# Patient Record
(demographics unavailable — no encounter records)

---

## 2024-10-08 NOTE — ASSESSMENT
[FreeTextEntry1] : Subjective findings This 79-year-old male presents to us with pain in the back with a rating component down the left leg.  Patient says that this pain started after a fall off of a scaffold from 8 feet height.  Patient's been under the care of a neurosurgeon and is contemplating surgery but suggested the patient try epidural steroid injections prior to the surgery.  Patient denies any bowel or bladder incontinence any progressive weakness but states the pain has become problematic patient presents to us for evaluation.  The CV/Pulm/GI/Heme/Renal/Hepatic//Psych/Endo systems are reviewed. A full ROS was performed and reviewed with the patient today, see intake form.  Average pain score for the month is 6 out of ten. The patient's current medications are documented to the best of their ability. The patient has failed conservative therapies to include medical management, and physical activity to treat the pain. The patient has decreased function secondary to the pain. Objective findings Imaging studies are consistent with the patient's pain complaints.  Patient is able to get to a standing position with minimal difficulty.  While standing patient ambulates without an assistive device.  Motor and sensory exams appear grossly intact. Assessment Lumbar radiculopathy Plan Spoke to patient about epidural steroid injections. Explained risks, benefits and alternatives including but not limited to the risk of infection, bleeding, headache, syncopal episode, failure to resolve issues, allergic reaction, symptom recurrence, allergic reaction, nerve injury, and increased pain. The patient understands the risks. All questions were answered. The patient is willing to proceed. The importance of increasing exercise after the injection is also stressed. The use of the injection as a jump start so that the patient can do more exercise, but that the exercise is the long-term answer for the patient is reviewed. The patient states understanding.  This note was generated by using Dragon medical dictation software.  A reasonable effort has been made for proofreading its contents, but typos may still remain.  If there are any questions or points of clarification needed, please notify my office.  ]

## 2024-10-08 NOTE — HISTORY OF PRESENT ILLNESS
[FreeTextEntry1] : THIS PLEASANT PATIENT IS 79 year OLD  male  WHO PRESENT TODAY IN OFFICE WITH LSPINE RADATING TO THE LT HIP DWN TO THE FOOT      DATE OF INJURY/ONSET  3 WKS AGO PROGRESSIGN      PAIN LEVEL: 7/10     MECHANISM OF INJURY: UNKOWN INJURAY         QUALITY OF SYMPTOMS:  BURNIONG, SHOOTING, STABBIGN      IMPROVES WITH:  NTHING     WORSE WITH:  SITITNG, STANDING, WALKING      PRIOR TREATMENT:  2024 PATIENT IS PRESENTING WITH ACUTE/SUB-ACUTE RADICULAR PAIN WITH IMPAIRMENT IN ADLs AND FUNCTIONALITY. THE PATIENT HAS NOT RESPONDED TO CONSERVATIVE CARE INCLUDING NSAID THERAPY AND/OR PHYSICAL THERAPY 2-3X A WEEK FOR 6 WEEK.           PRIOR IMAGIN       SCHOOL/SPORT/POSITION/OCCUPATION:       ADDITIONAL INFORMATION

## 2024-11-13 NOTE — PHYSICAL EXAM
[Alert] : alert [No Acute Distress] : in no acute distress [Sclera] : the sclera and conjunctiva were normal [EOMI] : extraocular movements were intact [Normal Outer Ear/Nose] : the ears and nose were normal in appearance [Normal Appearance] : the appearance of the neck was normal [No Respiratory Distress] : no respiratory distress [No Acc Muscle Use] : no accessory muscle use [Respiration, Rhythm And Depth] : normal respiratory rhythm and effort [Auscultation Breath Sounds / Voice Sounds] : lungs were clear to auscultation bilaterally [Normal S1, S2] : normal S1 and S2 [Heart Rate And Rhythm] : heart rate was normal and rhythm regular [Edema] : edema was not present [Abdomen Tenderness] : non-tender [Abdomen Soft] : soft [Involuntary Movements] : no involuntary movements were seen [Normal Color / Pigmentation] : normal skin color and pigmentation [No Focal Deficits] : no focal deficits [Normal Affect] : the affect was normal [Normal Mood] : the mood was normal [de-identified] : obese

## 2024-11-13 NOTE — HISTORY OF PRESENT ILLNESS
[FreeTextEntry1] : 79yoM with CKD, HTN, lumbar and cervical stenosis, predm2, seen for follow up visit   htn: states he is adherent with amlodine 5mg  denies acute pain today has gained weight  dysphagia:  reports episodes of dysphagia, more with liquids than food during both lunch and dinner  sob: has been ongoing denies new coughiing or cold was referred to pulmonary, but he hasn't yet mad appointment he is a  and has hx of 9/11 exposure hasn't had PFT's yet had normal nuclear stress test 1/2024 also with more sedentary lately and weight gain  predm2: 9/2024 labs stable 5.7%  CKD: 9/2024 labs with stable cr and electrolytes cr ~1.5 hx of hyperkalemia with hctz and acei  sleep disturbance: states trazadone 50mg was only partially effective denies side effects  pain: has been taking gabapentin once to twice daily which is helping hx of steroid injection with ortho   hx of vertebral fracture sept 2023 after falling 9 feet of C6, T2, and sternal fracture: denies any pain within his cervical spine left leg dvt provoked 9/2023: completed tx and had negative f/u doppler

## 2024-11-13 NOTE — ASSESSMENT
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

## 2024-11-13 NOTE — REVIEW OF SYSTEMS
[Fever] : no fever [Chills] : no chills [Eyesight Problems] : no eyesight problems [As Noted in HPI] : as noted in HPI [Sleep Disturbances] : sleep disturbances [Negative] : Integumentary

## 2024-11-25 NOTE — REVIEW OF SYSTEMS
[Shortness Of Breath] : shortness of breath [SOB on Exertion] : shortness of breath during exertion [Fever] : no fever [Chills] : no chills [Chest Pain] : no chest pain [Palpitations] : no palpitations [Lower Ext Edema] : no extremity edema [Cough] : no cough

## 2024-11-25 NOTE — ASSESSMENT
[FreeTextEntry1] : dyspnea: suspect COPD pending pft- given the worsening symptoms and now delay in pfts until next month will start trial of spiriva and albuterol. 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  sleep disturbance: cw trazadone to 100mg qhs

## 2024-11-25 NOTE — PHYSICAL EXAM
[Alert] : alert [No Acute Distress] : in no acute distress [Sclera] : the sclera and conjunctiva were normal [EOMI] : extraocular movements were intact [Normal Appearance] : the appearance of the neck was normal [No Respiratory Distress] : no respiratory distress [No Acc Muscle Use] : no accessory muscle use [Respiration, Rhythm And Depth] : normal respiratory rhythm and effort [Normal S1, S2] : normal S1 and S2 [Murmurs] : no murmurs [Heart Rate And Rhythm] : heart rate was normal and rhythm regular [Edema] : edema was not present [Abdomen Soft] : soft [Normal Gait] : normal gait [Involuntary Movements] : no involuntary movements were seen [Normal Color / Pigmentation] : normal skin color and pigmentation [No Focal Deficits] : no focal deficits [Normal Affect] : the affect was normal [Normal Mood] : the mood was normal [de-identified] : decreased breath sounds in right base [de-identified] : obese

## 2024-11-25 NOTE — HISTORY OF PRESENT ILLNESS
[FreeTextEntry1] : 79yoM with CKD, HTN, lumbar and cervical stenosis, predm2, seen for follow up visit  htn: states he is adherent with amlodine 10mg  dysphagia: reports episodes of dysphagia, more with liquids than food during both lunch and dinner pending swallow study  sob: has been ongoing, both at rest, and worse with exertion.  states its getting worse. denies new coughiing or cold was referred to pulmonary,but he missed his appointment today- he is a  and has hx of 9/11 exposure hasn't had PFT's yet had normal nuclear stress test 1/2024, echo with normal LVEF, minimal valvular disease also with more sedentary lately and weight gain Xray 12/27/2023 unrevealing  predm2: 9/2024 labs stable 5.7%  CKD: 9/2024 labs with stable cr and electrolytes cr ~1.5 hx of hyperkalemia with hctz and acei  sleep disturbance: remains on trazadone 100mg denies side effects  pain: has been taking gabapentin once to twice daily which is helping hx of steroid injection with ortho  hx of vertebral fracture sept 2023 after falling 9 feet of C6, T2, and sternal fracture: denies any pain within his cervical spine left leg dvt provoked 9/2023: completed tx and had negative f/u doppler  he denies any bleeding

## 2024-12-04 NOTE — ASSESSMENT
[FreeTextEntry1] : Interim history The patient returns with pain that has been treated adequately in the past with epidural steroid injections.  The patient's complaints are consistent with radiculopathy. Patient's ADL's increase with prior ANGELICA.   The last injection gave greater than 60% reduction of the pain but now there is a return of symptoms. The patient is requesting a subsequent epidural steroid injection to alleviate pain and improve functional ability and quality of life.  Patient is a candidate. Objective findings Since the last visit, there have been no new imaging studies. The patient has no new symptoms except the return of the their pain complaints. Motor and sensory function is unchanged. Plan Spoke to patient about epidural steroid injections.  This will be a distinctly different level than the last injection.  Explained risks, benefits and alternatives including but not limited to the risk of infection, bleeding, headache, syncopal episode, failure to resolve issues, allergic reaction, symptom recurrence, allergic reaction, nerve injury, and increased pain. The patient understands the risks. All questions were answered. The patient is willing to proceed.  This note was generated by using Dragon medical dictation software.  A reasonable effort has been made for proofreading its contents, but typos may still remain.  If there are any questions or points of clarification needed, please notify my office.

## 2024-12-04 NOTE — HISTORY OF PRESENT ILLNESS
[FreeTextEntry1] : Mr. MARLENE FRANCIS IS FOLLOWING UP FOR  RETURNING PAIN OF LSPINE RADATING DWN BTH HIP DWN LEG  . PT STATES LAST ANGELICA 2024 DECREASED PAIN AND INCREASED ADL.   DURATION OF RELIEF: PATIENT HAD RELIEF FOR 1MNTH    PAIN LEVEL BEFORE ANGELICA:7/10  PAIN LEVEL AFTER ANGELICA:2/10  CURRENT PAIN LEVEL:7/10  DATE OF INJURY/ONSET  3 WKS AGO PROGRESSIGN         MECHANISM OF INJURY: UNKOWN INJURAY         QUALITY OF SYMPTOMS:  BURNIONG, SHOOTING, STABBIGN      IMPROVES WITH:  NTHING     WORSE WITH:  SITITNG, STANDING, WALKING      PRIOR TREATMENT:  2024 PATIENT IS PRESENTING WITH ACUTE/SUB-ACUTE RADICULAR PAIN WITH IMPAIRMENT IN ADLs AND FUNCTIONALITY. THE PATIENT HAS NOT RESPONDED TO CONSERVATIVE CARE INCLUDING NSAID THERAPY AND/OR PHYSICAL THERAPY 2-3X A WEEK FOR 6 WEEK.           PRIOR IMAGIN       SCHOOL/SPORT/POSITION/OCCUPATION:       ADDITIONAL INFORMATION

## 2024-12-17 NOTE — PHYSICAL EXAM
[Normal] : normal venous pressure, no carotid bruit [___ +] : bilateral [unfilled]U+ pitting edema to the ankles

## 2024-12-17 NOTE — CARDIOLOGY SUMMARY
[de-identified] : December 17, 2024 sinus rhythm unchanged from prior consistent with possible pulmonary disease

## 2024-12-17 NOTE — ASSESSMENT
[FreeTextEntry1] : Impression: 1.  Chronic dyspnea on exertion which has been going on for many years most probably pulmonary in nature as his  heart exams noninvasively have been normal. 2.  Ankle edema most probably secondary to high-dose amlodipine use.  Plan: 1.  Unfortunately options at this moment are limited with regards to hypertension due to his renal insufficiency that has been demonstrated on blood work the last 1 done in May this also may preclude coronary CTA to rule out significant disease  For now we will discontinue amlodipine  Start hydralazine 25 mg twice per day  Obtain repeat renal profile  Will reassess after this is done  Have encouraged patient to follow-up with his scheduled pulmonary appointment

## 2024-12-17 NOTE — REASON FOR VISIT
[Other: ____] : [unfilled] [FreeTextEntry1] : Patient returns for reevaluation because he continues to have shortness of breath.  He has no chest pain.  He does have pedal edema however.  The patient states he is here at the advice of his daughters.  He has had a negative noninvasive workup within the last year.  He iwas a9/11 .  [employer] worker

## 2024-12-30 NOTE — HISTORY OF PRESENT ILLNESS
[Dyspnea] : dyspnea [Lower Extremity Swelling] : lower extremity swelling [Poor Exercise Tolerance] : poor exercise tolerance [Prior Anesthesia] : Prior anesthesia [Poor] : Poor [Preoperative Visit] : for a medical evaluation prior to surgery [Scheduled Procedure ___] : a [unfilled] [Date of Surgery ___] : on [unfilled] [Fever] : no fever [Chills] : no chills [Fatigue] : no fatigue [Cough] : no cough [Dysuria] : no dysuria [Urinary Frequency] : no urinary frequency [Nausea] : no nausea [Vomiting] : no vomiting [Diarrhea] : no diarrhea [Abdominal Pain] : no abdominal pain [Easy Bruising] : no easy bruising [Anti-Platelet Agents] : no anti-platelet agents [Nicotine Dependence] : no nicotine dependence [Alcohol Use] : no  alcohol use [Thromboembolic Problems] : no thromboembolic problems [Frequent use of NSAIDs] : no use of NSAIDs [Prev Anesthesia Reaction] : no previous anesthesia reaction [Anesthesia Reaction] : no anesthesia reaction [Clotting Disorder] : no clotting disorder [Bleeding Disorder] : no bleeding disorder [de-identified] : Dr. Tomy Latif [FreeTextEntry1] : 79yoM with CKD, HTN, lumbar and cervical stenosis, predm2, seen for preoperative clearance.  htn: no longer on norvasc due to pedal edema now on hydralazine 25mg bid and valsartan-hctz 160-12.5mg daily  BP controlled reports still with some pedal edema  dysphagia: reports episodes of dysphagia, more with liquids than food during both lunch and dinner pending swallow study  sob: has been ongoing, both at rest, and worse with exertion.  denies new coughing or cold was referred to pulmonary, pending appointment Jan 7th. he is a  and has hx of 9/11 exposure hasn't had PFT's yet had normal nuclear stress test 1/2024, echo with normal LVEF, minimal valvular disease recently seen by cardiologist and is scheduled for CT angio coronary on Jan 3rd. also with more sedentary lately and weight gain Xray 12/27/2023 unrevealing  predm2: 9/2024 labs stable 5.7%  CKD: GFR ~ 53 12/2024:  Cr. 1.35 with normal electrolytes 9/2024 labs with stable cr and electrolytes   sleep disturbance: remains on trazadone 100mg, but still with incomplete relief denies side effects  pain: has been taking gabapentin once to twice daily which is helping hx of steroid injection with ortho   hx of vertebral fracture sept 2023 after falling 9 feet of C6, T2, and sternal fracture: denies any pain within his cervical spine left leg dvt provoked 9/2023: completed tx and had negative f/u doppler  he denies any bleeding

## 2024-12-30 NOTE — ASSESSMENT
[FreeTextEntry1] : left hip pain with radiculopathy: with lumbar disease: pain control with tylenol 2 tabs bid gabapentin 300mg  bid   shortness of breath with exertion: CT aqngio Friday puolmonary Dr. Munson jan 7th swallow study jan 234d  Pre-op [facility] 12/31 -  Dr. Tomy Latif-ortho spine- surgeon open MRI few weeks ago  Blurry vision: seeing ophthal next week

## 2025-01-10 NOTE — ASSESSMENT
[FreeTextEntry1] : 79M CKD, HTN, and snoring presenting for initial pulmonary evaluation in the setting of dyspnea.  #Preop Pulmonary Evaluation - Patient is awaiting potential spinal surgery but is undergoing further workup with PMD and Cardiology. - There are no absolute pulmonary contraindications, at this time, to proceeding with a spinal surgery. The patient is presently LOW risk based on the ARISCAT risk calculator for perioperative pulmonary complications. - Patient does not require any further pulmonary testing or treatments at this time. He has not had any recent hospitalizations for pulmonary infections or any evidence of hypoxemia.  #Interstitial Lung Disease - Patient is unsure whether he would agree to treatment or not and wants to consider his options but he is amenable to blood work - Serologic workup today to evaluate for potential causes - PFTs noted with restrictive ventilatory defect (mild) and normal diffusion capacity - Repeat CT chest in 6 months - CPET - Patient with extensive exposures as a  and with time at 9/11. I provided him with the contact information for the [facility] to see if he is a candidate  #Dyspnea - likely multifactorial and has developed after his injury 1 year ago after which time his activity level has decreased significantly - Weight loss will be beneficial - Increase activity level - Cardiology evaluation underway - echo from 2024 did not reveal evidence of pulmonary hypertension - PFTs without significant bronchodilator response but patient felt a little better after Albuterol therefore will give a trial of Albuterol and monitor for improvement  #Snoring and Obesity - Patient recommended for HST but states he does not want to proceed with this at this time - Weight loss will be beneficial - Patient is aware of the recommendation and will consider  #Health Maintenance Spirometry: Reviewed  Smoking status: Never smoker  Pneumococcal vaccination status:  Patient unsure - should be done if not done previously Newell Sleepiness Scale: Not a sleep visit  #Follow-up in 2-4 months or sooner if needed - All questions answered - Extensive discussion with patient. I also offered to call his daughters or granddaughter today to discuss our findings and recommendations  but he declined -  Today's medical care services serve as the continuing focal point for needed health care services that are part of ongoing care related to a patient's one or more serious conditions or a complex condition.   The above plan was discussed with MARLENE FRANCIS  in detail. Patient verbalized understanding and agrees with plan as detailed above. Patient was provided education and counselling on current diagnosis/symptoms, diagnostic work up, treatment options and potential side effects of any prescribed therapy/therapies. MARLENE  was advised to call our clinic at [phone] for any new or worsening symptoms, or with any questions or concerns. In case of acute onset of respiratory symptoms or worsening presentation, patient was advised to present to nearest emergency room for further evaluation. MARLENE  expressed understanding and all questions/concerns were addressed.

## 2025-01-10 NOTE — REVIEW OF SYSTEMS
[Fatigue] : fatigue [Recent Wt Gain (___ Lbs)] : ~T recent [unfilled] lb weight gain [EDS] : eds [Dyspnea] : dyspnea [SOB on Exertion] : sob on exertion [Nocturia] : nocturia [Arthralgias] : arthralgias [Obesity] : obesity [Fever] : no fever [Chills] : no chills [Sore Throat] : no sore throat [Nasal Congestion] : no nasal congestion [Postnasal Drip] : no postnasal drip [Cough] : no cough [Hemoptysis] : no hemoptysis [Chest Tightness] : no chest tightness [Frequent URIs] : no frequent URIs [Sputum] : no sputum [Wheezing] : no wheezing [Chest Discomfort] : no chest discomfort [Claudication] : no claudication [Leg Cramps] : no leg cramps [Orthopnea] : no orthopnea [Angioedema] : no angioedema [Abdominal Pain] : no abdominal pain [Nausea] : no nausea [Vomiting] : no vomiting [Myalgias] : no myalgias [Headache] : no headache [Focal Weakness] : no focal weakness [Seizures] : no seizures [Head Injury] : no head injury [Depression] : no depression [Anxiety] : no anxiety [Diabetes] : no diabetes [TextBox_14] : voice changes when short of breath [TextBox_91] : back and LE pain
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
## 2025-01-10 NOTE — PHYSICAL EXAM
[No Acute Distress] : no acute distress [Well Nourished] : well nourished [No Deformities] : no deformities [Well Developed] : well developed [Normal Oropharynx] : normal oropharynx [Normal Appearance] : normal appearance [Supple] : supple [No Neck Mass] : no neck mass [Normal Rate/Rhythm] : normal rate/rhythm [Normal S1, S2] : normal s1, s2 [No Resp Distress] : no resp distress [No Acc Muscle Use] : no acc muscle use [Normal Rhythm and Effort] : normal rhythm and effort [Clear to Auscultation Bilaterally] : clear to auscultation bilaterally [Not Tender] : not tender [Soft] : soft [Normal Gait] : normal gait [No Clubbing] : no clubbing [No Cyanosis] : no cyanosis [No Edema] : no edema [Normal Color/ Pigmentation] : normal color/ pigmentation [No Focal Deficits] : no focal deficits [No Motor Deficits] : no motor deficits [Oriented x3] : oriented x3 [Normal Affect] : normal affect [TextBox_11] : NCAT, EOMI, anicteric, MMM, no thrush [TextBox_68] : no wheeze or rhonch [TextBox_89] : obese

## 2025-01-10 NOTE — END OF VISIT
[Time Spent: ___ minutes] : I have spent [unfilled] minutes of time on the encounter which excludes teaching and separately reported services. [FreeTextEntry3] : My cumulative time spent on today's visit included: Preparation for the visit, review of the medical records, review of pertinent diagnostic studies, review and integration of laboratory data, coordination of care, examination and counseling of the patient on the above diagnosis, treatment plan and prognosis, orders of diagnostic tests and any additional lab data ordered today, medications and/or appropriate procedures and documentation in the medical records of today's visit.

## 2025-01-17 NOTE — PHYSICAL EXAM
[Alert] : alert [No Acute Distress] : in no acute distress [Sclera] : the sclera and conjunctiva were normal [EOMI] : extraocular movements were intact [No Respiratory Distress] : no respiratory distress [No Acc Muscle Use] : no accessory muscle use [Respiration, Rhythm And Depth] : normal respiratory rhythm and effort [Auscultation Breath Sounds / Voice Sounds] : lungs were clear to auscultation bilaterally [Normal S1, S2] : normal S1 and S2 [Heart Rate And Rhythm] : heart rate was normal and rhythm regular [No Clubbing, Cyanosis] : no clubbing or cyanosis of the fingernails [Involuntary Movements] : no involuntary movements were seen [Normal Color / Pigmentation] : normal skin color and pigmentation [de-identified] : mild pedal edema

## 2025-01-17 NOTE — ASSESSMENT
[FreeTextEntry1] : htn: improved c/w current regimen of hydralazine 50mg TID and nifedipine 30mg BID c/w daily bp checks x 1 week  ckd: avoid tea, increase hydration with water (may add 1/2 applejuice) overall trending better  repeat bmp next week tuesday   if Cr back to baseline, he can have the CT angio heart  will call next week with labwork results

## 2025-01-17 NOTE — HISTORY OF PRESENT ILLNESS
[FreeTextEntry1] : 79yoM seen for follow up for htn and ckd   saw nephrology last week started on nifedipine 30mg bid and increased hydralazine to 50mg TID bp's at [facility] have been "140/80" today same  CKD: [facility] end of dec with cr up to 2.00 patient states he drinks "no water" during day only tea, snapple. repeat labs 1/15 with improving in cr to 1.77  other medications verified he continues with pain in his left leg radiculopathy- wanting to have back surgery pending CT

## 2025-02-03 NOTE — ASSESSMENT
[FreeTextEntry1] : sob associated with chest tightness: concern for ACS vs acute heart failure ekg : sinus at rate of 73, with nonspecific t wave flattening in III, poor quality v1, v2 with ST changes  concern for possible ACS given clinical presentation recommend patient seek immediate evaluation in ER patient's jajanancy Hood called during visit and notified of plan EMS arrived and will take patient to ER  HTN: uncontrolled will also need adjustment in BP medications   CKD: following with Dr. Renaldo Pettit - Nephrologist  [phone] [facility] end of dec with cr up to 2.00 repeat labs 1/15 with improving in cr to 1.77 1/27/25:  Cr 1.55  eGFR 45

## 2025-02-03 NOTE — REVIEW OF SYSTEMS
[Fever] : no fever [As noted in HPI] : as noted in HPI [As Noted in HPI] : as noted in HPI [Negative] : Gastrointestinal

## 2025-02-03 NOTE — HISTORY OF PRESENT ILLNESS
[FreeTextEntry1] : 79yoM with CKD, HLD, seen for acute visit.  states yesterday walked around 1-2 hours at event, when got home with edema and sob. last nigh awoke at 3 am with severe SOB and chest tightness located in middle of chest-  had to sit in chair, and lasted approx 15- 20 minutes, then slowly felt better. today still with sob and milder chest tightness.  htn: bp elevated today states he took all of his medications

## 2025-02-03 NOTE — PHYSICAL EXAM
[Alert] : alert [Sclera] : the sclera and conjunctiva were normal [EOMI] : extraocular movements were intact [Normal Outer Ear/Nose] : the ears and nose were normal in appearance [Normal Appearance] : the appearance of the neck was normal [No Acc Muscle Use] : no accessory muscle use [Normal S1, S2] : normal S1 and S2 [Heart Rate And Rhythm] : heart rate was normal and rhythm regular [Abdomen Tenderness] : non-tender [Abdomen Soft] : soft [Involuntary Movements] : no involuntary movements were seen [Normal Color / Pigmentation] : normal skin color and pigmentation [No Focal Deficits] : no focal deficits [Normal Affect] : the affect was normal [Normal Mood] : the mood was normal [de-identified] : decreased breath sounds in right base [de-identified] : mild edema b/l lower legs [de-identified] : obese

## 2025-02-12 NOTE — ASSESSMENT
[FreeTextEntry1] : The patient was referred to the echocardiography laboratory.  Echocardiogram revealed normal left ventricular systolic function and no evidence of significant valvular stenosis or insufficiency.  In summary, the patient is a 79-year-old man with dyspnea his dyspnea is both exertional and nonexertional and has been quite chronic.  Based on his workup including his CTA it is very unlikely that his dyspnea is based on a cardiac etiology.  I have explained to him that he does indeed have mild nonobstructive coronary disease and that this could get worse and cause symptoms but I do not believe his current symptoms are cardiac in nature.  Given his atherosclerosis I have started him on atorvastatin 10 mg/day.  I have advised him to keep his pulmonary appointments  I have advised him to check his lipid profile in 2 months and return here for follow-up in 3 months time.  I have discussed the more classic symptoms of coronary disease including dyspnea only on exertion and midsternal chest discomfort with the patient.  I have encouraged him to call me with any issues that he believes might be of significance

## 2025-02-12 NOTE — REASON FOR VISIT
[Symptom and Test Evaluation] : symptom and test evaluation [FreeTextEntry1] : Patient returns for follow-up.  He continues to complain of shortness of breath.  His shortness of breath can be at rest or with exertion.  Of note is the fact that he was taken to the emergency room in [place] several weeks ago where myocardial infarction was ruled out.  Of note is the fact that on January 10 he had an evaluation by pulmonologist and is scheduled to return at the end of the month.  The patient claims that he had not seen a pulmonologist.  Of all of note is the fact as well that he underwent CTA recently which revealed nonobstructive disease.  His worst lesion was a 50 to 60% mid LAD lesion.  His left main was normal and circumflex and right coronary only had very minimal disease.
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
## 2025-02-27 NOTE — REVIEW OF SYSTEMS
[Fatigue] : fatigue [Recent Wt Gain (___ Lbs)] : ~T recent [unfilled] lb weight gain [EDS] : eds [Dyspnea] : dyspnea [SOB on Exertion] : sob on exertion [Nocturia] : nocturia [Arthralgias] : arthralgias [Obesity] : obesity [TextBox_14] : voice changes when short of breath [Fever] : no fever [Chills] : no chills [Sore Throat] : no sore throat [Nasal Congestion] : no nasal congestion [Postnasal Drip] : no postnasal drip [Cough] : no cough [Hemoptysis] : no hemoptysis [Chest Tightness] : no chest tightness [Frequent URIs] : no frequent URIs [Sputum] : no sputum [Wheezing] : no wheezing [Chest Discomfort] : no chest discomfort [Claudication] : no claudication [Leg Cramps] : no leg cramps [Orthopnea] : no orthopnea [Angioedema] : no angioedema [Abdominal Pain] : no abdominal pain [Nausea] : no nausea [Vomiting] : no vomiting [Myalgias] : no myalgias [Headache] : no headache [Focal Weakness] : no focal weakness [Seizures] : no seizures [Head Injury] : no head injury [Depression] : no depression [Anxiety] : no anxiety [Diabetes] : no diabetes [TextBox_91] : back and LE pain
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
## 2025-02-27 NOTE — END OF VISIT
[FreeTextEntry3] : My cumulative time spent on today's visit included: Preparation for the visit, review of the medical records, review of pertinent diagnostic studies, review and integration of laboratory data, coordination of care, examination and counseling of the patient on the above diagnosis, treatment plan and prognosis, orders of diagnostic tests and any additional lab data ordered today, medications and/or appropriate procedures and documentation in the medical records of today's visit.  [Time Spent: ___ minutes] : I have spent [unfilled] minutes of time on the encounter which excludes teaching and separately reported services.
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
## 2025-03-03 NOTE — ASSESSMENT
[Procedure Low Risk] : the procedure risk is low [Patient Low Risk] : the patient is a low surgical risk [Optimized for Surgery] : the patient is optimized for surgery [FreeTextEntry1] : patient is medically optimized to proceed with planned left cataract surgery. EKG from 2/12/2025 reviewed: sinus rhythm at rate of 77 with no acute abnormalities  patient is pending sleep study for GUIDO evaluation -- htn: BP slightly elevated today - he is due for his mid day hydralazine discussed medication adherence c/w nifedipine   anxiety: start zoloft 25mg daily  neuropathy: trial without gabapentin 300mg in am, monitor symtpoms, if worsens restart c/w gabapentin 300mg qhs

## 2025-03-03 NOTE — PHYSICAL EXAM
[Sclera] : the sclera and conjunctiva were normal [EOMI] : extraocular movements were intact [Normal Outer Ear/Nose] : the ears and nose were normal in appearance [Normal] : the appearance was normal, neck was supple [No Respiratory Distress] : no respiratory distress [No Acc Muscle Use] : no accessory muscle use [Respiration, Rhythm And Depth] : normal respiratory rhythm and effort [Auscultation Breath Sounds / Voice Sounds] : lungs were clear to auscultation bilaterally [Normal S1, S2] : normal S1 and S2 [Heart Rate And Rhythm] : heart rate was normal and rhythm regular [Abdomen Tenderness] : non-tender [Abdomen Soft] : soft [Involuntary Movements] : no involuntary movements were seen [Normal Color / Pigmentation] : normal skin color and pigmentation [No Focal Deficits] : no focal deficits [Normal Affect] : the affect was normal [Normal Mood] : the mood was normal [de-identified] : trace edema b/l

## 2025-03-03 NOTE — HISTORY OF PRESENT ILLNESS
[Cardiovascular Disease] : cardiovascular disease [Pulmonary Disease] : pulmonary disease [Renal Disease] : renal disease [Prior Anesthesia] : Prior anesthesia [Stable] : Stable [Dyspnea] : dyspnea [Preoperative Visit] : for a medical evaluation prior to surgery [Scheduled Procedure ___] : a [unfilled] [Date of Surgery ___] : on [unfilled] [Fever] : no fever [Chills] : no chills [Fatigue] : no fatigue [Chest Pain] : no chest pain [Cough] : no cough [Dysuria] : no dysuria [Urinary Frequency] : no urinary frequency [Nausea] : no nausea [Vomiting] : no vomiting [Diarrhea] : no diarrhea [Abdominal Pain] : no abdominal pain [Easy Bruising] : no easy bruising [Thromboembolic Problems] : no thromboembolic problems [Frequent use of NSAIDs] : no use of NSAIDs [Prev Anesthesia Reaction] : no previous anesthesia reaction [Echocardiogram] : ~T an echocardiogram ~C was performed [Fair] : Fair [Anesthesia Reaction] : no anesthesia reaction [Sudden Death] : no sudden death [Clotting Disorder] : no clotting disorder [Bleeding Disorder] : no bleeding disorder [de-identified] : Dr. Lona Ramon [de-identified] : CT angio coronary 2/10/2025: mild nonobstructive CAD [FreeTextEntry1] : 79yoM with CKD, HTN, lumbar and cervical stenosis, predm2, ILD, seen for preoperative clearance.  CT angio coronary 2/10/2025: mild nonobstructive CAD on lipitor 10mg daily  htn: remains on hydralazine and nifedipine  hx of vertebral fracture sept 2023 after falling 9 feet of C6, T2, and sternal fracture: denies any pain within his cervical spine left leg dvt provoked 9/2023: completed tx and had negative f/u doppler  predm2: 9/2024 labs stable 5.7%  CKD: GFR ~ 53 12/2024: Cr. 1.35 with normal electrolytes 9/2024 labs with stable cr and electrolytes   sleep disturbance: remains on trazadone 100mg, worse anxiety DALE 7 score of 18 c/w moderate anxiety GDS = 1 negative for depression  pain: has been taking gabapentin once to twice daily which is helping hx of steroid injection with ortho

## 2025-03-03 NOTE — HISTORY OF PRESENT ILLNESS
[Cardiovascular Disease] : cardiovascular disease [Pulmonary Disease] : pulmonary disease [Renal Disease] : renal disease [Prior Anesthesia] : Prior anesthesia [Stable] : Stable [Dyspnea] : dyspnea [Preoperative Visit] : for a medical evaluation prior to surgery [Scheduled Procedure ___] : a [unfilled] [Date of Surgery ___] : on [unfilled] [Fever] : no fever [Chills] : no chills [Fatigue] : no fatigue [Chest Pain] : no chest pain [Cough] : no cough [Dysuria] : no dysuria [Urinary Frequency] : no urinary frequency [Nausea] : no nausea [Vomiting] : no vomiting [Diarrhea] : no diarrhea [Abdominal Pain] : no abdominal pain [Easy Bruising] : no easy bruising [Thromboembolic Problems] : no thromboembolic problems [Frequent use of NSAIDs] : no use of NSAIDs [Prev Anesthesia Reaction] : no previous anesthesia reaction [Echocardiogram] : ~T an echocardiogram ~C was performed [Fair] : Fair [Anesthesia Reaction] : no anesthesia reaction [Sudden Death] : no sudden death [Clotting Disorder] : no clotting disorder [Bleeding Disorder] : no bleeding disorder [de-identified] : Dr. Lona Ramon [de-identified] : CT angio coronary 2/10/2025: mild nonobstructive CAD [FreeTextEntry1] : 79yoM with CKD, HTN, lumbar and cervical stenosis, predm2, ILD, seen for preoperative clearance.  CT angio coronary 2/10/2025: mild nonobstructive CAD on lipitor 10mg daily  htn: remains on hydralazine and nifedipine  hx of vertebral fracture sept 2023 after falling 9 feet of C6, T2, and sternal fracture: denies any pain within his cervical spine left leg dvt provoked 9/2023: completed tx and had negative f/u doppler  predm2: 9/2024 labs stable 5.7%  CKD: GFR ~ 53 12/2024: Cr. 1.35 with normal electrolytes 9/2024 labs with stable cr and electrolytes   sleep disturbance: remains on trazadone 100mg, worse anxiety DALE 7 score of 18 c/w moderate anxiety GDS = 1 negative for depression  pain: has been taking gabapentin once to twice daily which is helping hx of steroid injection with ortho

## 2025-03-03 NOTE — PHYSICAL EXAM
[Sclera] : the sclera and conjunctiva were normal [EOMI] : extraocular movements were intact [Normal Outer Ear/Nose] : the ears and nose were normal in appearance [Normal] : the appearance was normal, neck was supple [No Respiratory Distress] : no respiratory distress [No Acc Muscle Use] : no accessory muscle use [Respiration, Rhythm And Depth] : normal respiratory rhythm and effort [Auscultation Breath Sounds / Voice Sounds] : lungs were clear to auscultation bilaterally [Normal S1, S2] : normal S1 and S2 [Heart Rate And Rhythm] : heart rate was normal and rhythm regular [Abdomen Tenderness] : non-tender [Abdomen Soft] : soft [Involuntary Movements] : no involuntary movements were seen [Normal Color / Pigmentation] : normal skin color and pigmentation [No Focal Deficits] : no focal deficits [Normal Affect] : the affect was normal [Normal Mood] : the mood was normal [de-identified] : trace edema b/l

## 2025-04-09 NOTE — ASSESSMENT
[FreeTextEntry1] : patient is pending sleep study for GUIDO evaluation -- htn: controlled discussed medication adherence c/w nifedipine 30mg BID and hydralazine 50mg TID  CKD: Dr. Renaldo Pettit - Nephrologist  [facility] end of dec with cr up to 2.00 repeat labs 1/15 with improving in cr to 1.77 1/27/25: Cr 1.55 eGFR 45. 3/2025 : Cr 1.55  anxiety: he declined to start zoloft that was prescribed at last visit.  neuropathy: with lumbar spinal stenosis and disc disease c/w gabapentin 300mg bid pending follow up with ortho next week for

## 2025-04-09 NOTE — HISTORY OF PRESENT ILLNESS
[FreeTextEntry1] : 79yoM with CKD, HTN, lumbar and cervical stenosis, predm2, ILD, seen for follow up for htn and medication reconcilliation and lab review.  CT angio coronary 2/10/2025: mild nonobstructive CAD on lipitor 10mg daily  htn: remains on hydralazine and nifedipine admits to sometimes forgetting to take midday hydralazie  hx of vertebral fracture sept 2023 after falling 9 feet of C6, T2, and sternal fracture: denies any pain within his cervical spine left leg dvt provoked 9/2023: completed tx and had negative f/u doppler  predm2: 9/2024 labs stable 5.7%-- 3/2025 5.8%  CKD: GFR ~ 53 3/2025:  Cr 1.55 12/2024: Cr. 1.35 with normal electrolytes 9/2024 labs with stable cr and electrolytes  elevated hematocrit on labs from 3/2025  sleep disturbance: remains on trazadone 100mg discussed needing to have sleep study evaluation, as undiagnosed joanie could also be causing sleeping problems. he never started zoloft for anxiety from last visit-  he reports he doesn't want to start at this time.  lumbar pain/radiculopathy: numbness of left leg with sitting, unable to stand for long periods due to back pain/weakness has been taking gabapentin once to twice daily which is helping hx of steroid injection with ortho CT lumbar spine 4/1/25 reviewed: moderate lumbar central stenosis he is pending follow up with ortho next week  bph: has been taking flomax 2 caps qhs denies luts

## 2025-04-09 NOTE — PHYSICAL EXAM
[Sclera] : the sclera and conjunctiva were normal [Normal Outer Ear/Nose] : the ears and nose were normal in appearance [Normal Appearance] : the appearance of the neck was normal [Normal] : no respiratory distress, no accessory muscle use, normal respiratory rhythm and effort, lungs were clear to auscultation bilaterally [Normal S1, S2] : normal S1 and S2 [Heart Rate And Rhythm] : heart rate was normal and rhythm regular [No Clubbing, Cyanosis] : no clubbing or cyanosis of the fingernails [Involuntary Movements] : no involuntary movements were seen [Normal Color / Pigmentation] : normal skin color and pigmentation [No Focal Deficits] : no focal deficits [Normal Affect] : the affect was normal [Normal Mood] : the mood was normal [de-identified] : trace edema of left slightly more than right chronically [de-identified] : obese

## 2025-04-30 NOTE — HISTORY OF PRESENT ILLNESS
[FreeTextEntry1] : 79yoM seen for acute visit.  reports his family has noticied that his face is more red lately he denies headache, pain, or other rash just saw dermatologist s/p biopsy on top of scalp  did not yet for the sleep study test for concern for GUIDO he will think about it  episode of lightheadedness upon standing up at night to urinate denies dizziness during day denies vertigo bp during day is controlled he is aderent with bp medications  back pain: improved slightly with chiropractor remains on gabapentin

## 2025-04-30 NOTE — ASSESSMENT
[FreeTextEntry1] : facial redness: minimal, possibe side effect of nifedipine ccb-  reassurance provided advised to make f/u for sleep study- he will think about it.  dizziness: likely in setting of getting up too fast in middle of night to urinate discussed slower position changes from lying to standing  htn: controlled c/w current medications  ckd: stable c/w hydration discussed cr 1.55

## 2025-04-30 NOTE — PHYSICAL EXAM
[Alert] : alert [No Acute Distress] : in no acute distress [Sclera] : the sclera and conjunctiva were normal [EOMI] : extraocular movements were intact [Normal Outer Ear/Nose] : the ears and nose were normal in appearance [Normal Appearance] : the appearance of the neck was normal [No Respiratory Distress] : no respiratory distress [No Acc Muscle Use] : no accessory muscle use [Respiration, Rhythm And Depth] : normal respiratory rhythm and effort [Auscultation Breath Sounds / Voice Sounds] : lungs were clear to auscultation bilaterally [Edema] : edema was not present [No Focal Deficits] : no focal deficits [Normal Affect] : the affect was normal [Normal Mood] : the mood was normal [de-identified] : mild redness to face without rash

## 2025-05-19 NOTE — ASSESSMENT
[Vaccines Reviewed] : Immunizations reviewed today. Please see immunization details in the vaccine log within the immunization flowsheet.  [FreeTextEntry1] : chronic back pain he sees a chiropractor

## 2025-05-19 NOTE — HISTORY OF PRESENT ILLNESS
[de-identified] : Pt's wife passed away 3 yrs ago. He has 3 daughters and 2 granddaughters who want him to be seen for his symptoms. c/o fatigue, leg edema. He worked up to 1.5 yrs ago. He fell off an 8 foot scaffold and broke bones in his right foot and 4 bones in his back, sternum. Has been to orthopedics for spine and got turned off because they wanted to do surgery.  Has trouble sleeping and trazodone does not help. Takes 4 melatonin gummies each night.  has ckd. used to take a lot of NSAID's when he was working.  Goes to a chiropractor for back pain.

## 2025-06-09 NOTE — HISTORY OF PRESENT ILLNESS
[No falls in past year] : Patient reported no falls in the past year [PHQ-2 Negative - No further assessment needed] : PHQ-2 Negative - No further assessment needed [0] : 2) Feeling down, depressed, or hopeless: Not at all (0) [FreeTextEntry1] : 80yoM with CKD, HTN, lumbar and cervical stenosis, predm2, ILD, seen for follow up for htn.   recently had skin cancer on scalp that was removed, stitches in place, has f/u for removal on friday  CT angio coronary 2/10/2025: mild nonobstructive CAD on lipitor 10mg daily  htn: remains on hydralazine and nifedipine, denies side effects admits to sometimes forgetting to take midday hydralazie  hx of vertebral fracture sept 2023 after falling 9 feet of C6, T2, and sternal fracture left leg dvt provoked 9/2023: completed tx and had negative f/u doppler  predm2: 9/2024 labs stable 5.7%-- 3/2025 5.8%  CKD: GFR ~ 53 3/2025: Cr 1.55 12/2024: Cr. 1.35 with normal electrolytes 9/2024 labs with stable cr and electrolytes  elevated hematocrit on labs from 3/2025  sleep disturbance: remains on trazadone 100mg discussed needing to have sleep study evaluation, as undiagnosed joanie could also be causing sleeping problems. he never started zoloft for anxiety   lumbar pain/radiculopathy: numbness of left leg with sitting, unable to stand for long periods due to back pain/weakness has been taking gabapentin once to twice daily which is helping hx of steroid injection with ortho CT lumbar spine 4/1/25 reviewed: moderate lumbar central stenosis  bph: has been taking flomax 2 caps qhs denies luts [[id number]] : 0

## 2025-06-09 NOTE — ASSESSMENT
[FreeTextEntry1] : patient is pending sleep study for GUIDO evaluation -- htn: controlled discussed medication adherence c/w nifedipine 30mg BID and hydralazine 50mg TID  CKD: Dr. Renaldo Pettit - Nephrologist [place] end of dec with cr up to 2.00 repeat labs 1/15 with improving in cr to 1.77 1/27/25: Cr 1.55 eGFR 45. 3/2025 : Cr 1.55 5/2025 Cr 1.48  insomnia: c/w trazadone 100mg qhs stable  neuropathy: with lumbar spinal stenosis and disc disease c/w gabapentin 300mg bid

## 2025-06-09 NOTE — PHYSICAL EXAM
[Sclera] : the sclera and conjunctiva were normal [Normal Outer Ear/Nose] : the ears and nose were normal in appearance [EOMI] : extraocular movements were intact [Normal] : the appearance was normal, neck was supple [No Respiratory Distress] : no respiratory distress [No Acc Muscle Use] : no accessory muscle use [Respiration, Rhythm And Depth] : normal respiratory rhythm and effort [Auscultation Breath Sounds / Voice Sounds] : lungs were clear to auscultation bilaterally [Normal S1, S2] : normal S1 and S2 [Heart Rate And Rhythm] : heart rate was normal and rhythm regular [Abdomen Tenderness] : non-tender [Normal Gait] : normal gait [Involuntary Movements] : no involuntary movements were seen [Normal Affect] : the affect was normal [No Focal Deficits] : no focal deficits [Normal Mood] : the mood was normal [de-identified] : stitches in place without infection on right side of scalp [de-identified] : trace edema